# Patient Record
Sex: MALE | Race: WHITE | Employment: OTHER
[De-identification: names, ages, dates, MRNs, and addresses within clinical notes are randomized per-mention and may not be internally consistent; named-entity substitution may affect disease eponyms.]

---

## 2023-03-08 ENCOUNTER — HOSPITAL ENCOUNTER (OUTPATIENT)
Facility: HOSPITAL | Age: 68
Setting detail: RECURRING SERIES
Discharge: HOME | End: 2023-03-11
Payer: MEDICARE

## 2023-03-08 PROCEDURE — 97162 PT EVAL MOD COMPLEX 30 MIN: CPT

## 2023-03-08 NOTE — PROGRESS NOTES
[facility] PHYSICAL THERAPY  [address] [place] [address]. [facility] [address]. Josue Bejarano [facility] Phone: [phone] Fax   Plan of Care / Statement of Necessity for Physical Therapy Services     Patient Name: Chela Tatum : 1955   Medical   Diagnosis: BLE Weakness  BLE LYmph Treatment Diagnosis: No admission diagnoses are documented for this encounter. Onset Date: Chronic since  Payor: Payor: Christiano Gao / Plan: Christiano Gao / Product Type: *No Product type* /    Referral Source: Flippin Peterin [place], [place]* Start of Care [facility]): 3/8/2023   Prior Hospitalization: See medical history Provider #: [id number]   Prior Level of Function: Performs ADL interm, Requires rest break with yard work and prolonged walking    Comorbidities: Hx of Mitral Valve replacement, On Long term antibiotics due to frequent infections, Hx of BLE vein stripping, x6 surgeries to right knee, Right foot surgery, x2 right shoulder surgeries, x3 right hand surgeries, Left elbow cyst removal, Hx of Epilepsy   Medications: Verified on Patient Summary List     Assessment / key information:  Pt is 75 a male who prevents today with c/o R>L LE lymphedema following complex history of veinous insufficiency and x6 surgeries to RLE (TKRs and mutil revisions) due to recurrent infections. He is now on prolonged Doxycycline and presents with right LE lymphedema up to his thigh and left lower limb lymphedema to his knee. He was wearing knee high compression socks issued from [facility] however MD suggested he cease wearing them due to tourniquet effect. He refuses to wear right thigh compression due to difficulty donning. With prior right knee complications, pts right knee ROM limits also present difficulty with donning. Discussed at length today the benefits of how velcro compression garments would allow for thigh high compression and carry over from benefits  Pt and wife were educated on full CDT components. At this time pt would like to decline full services and trial home compression pump due to poor improvements with prior compression socks. Will send info to [facility] for compression pump and velcro garment coverage.   Currently:   Objective Information/Functional Measures/Assessment     Circumference measurements Left cm    Right cm   Big toe 10.5 11   MET 25.5 26.2   Heel/dorsum 38 39   Smallest ankle  28.7 31.3   10 cm prox to ankle 36.6 37.5   Widest Calf 42.9 47.5   Below Knee 48 43   Patella 58 49   10 cm up  62 54.5   20 cms up 69 61.5      Knee Flex Right 95 deg, Knee flex left 102deg.  Lower Limbs 2+ to 3+ pitting, discoloration of full lower limbs with varicose veins presents.       Evaluation Complexity:  History:  HIGH Complexity :3+ comorbidities / personal factors will impact the outcome/ POC ; Examination:  MEDIUM Complexity : 3 Standardized tests and measures addressin body structure, function, activity limitation and / or participation in recreation  ;Presentation:  MEDIUM Complexity : Evolving with changing characteristics  ;Clinical Decision Making:  MEDIUM Complexity : FOTO score of 26-74 FOTO score = an established functional score where 100 = no disability  Overall Complexity Rating: MEDIUM  Problem List: decrease ROM, decrease strength, edema affection function, impaired gait/balance, decrease ADL/functional abilities, decrease activity tolerance, decrease flexibility/joint mobility, and decrease transfer abilities    Treatment Plan may include any combination of the followin Therapeutic Exercise, 53243 Neuromuscular Re-Education, 53670 Manual Therapy, 45327 Therapeutic Activity, 53649 Self Care/Home Management, 40967 Electrical Stim unattended, 45872 Vasopneumatic Device, and 32453 Gait Training  Patient / Family readiness to learn indicated by: asking questions, trying to perform skills, interest, return verbalization , and return demonstration   Persons(s) to be included in  education: patient (P) and family support person (FSP); list wife  Barriers to Learning/Limitations: none  Measures taken if barriers to learning present: NA  Patient Goal (s): \"Id like to get a pump\"  Patient Self Reported Health Status: good  Rehabilitation Potential: fair    Short Term Goals: To be accomplished in 1 visit:   Pt will complete girth measures to facilitate requirements for at home compression pump. Status at IE NA  Long Term Goals: To be accomplished in 2-3 weeks   Pt will be compliant with at home pump use and donning compression garments for self management at home. Status at IE NA  2. Pt will be compliant with home decongestive AROM series to facilitate edema loss at home. Frequency / Duration: Patient would benefit from skilled PT 1-4  times up to 4 sessions as needed in this certification period. Goals will be assigned and reassessed every 10 visits/ 30 days per guidelines . Patient/ Caregiver education and instruction: Diagnosis, prognosis, self care and activity modification [x]  Plan of care has been reviewed with PTA    Certification Period: 3/8/23-6/7/23    Akosua Beltran, PT       3/8/2023       12:27 PM  ===================================================================  I certify that the above Therapy Services are being furnished while the patient is under my care. I agree with the treatment plan and certify that this therapy is necessary. [de-identified] Signature:_________________________   DATE:_________   TIME:________                           Bran Chaidez*    ** Signature, Date and Time must be completed for valid certification **  Please sign and return to [facility] or you may fax the signed copy to [phone]. Thank you.

## 2023-03-08 NOTE — PROGRESS NOTES
PHYSICAL / OCCUPATIONAL THERAPY - DAILY TREATMENT NOTE (updated )    Patient Name: Barbara Reyes    Date: 3/8/2023    : 1955  Insurance: Payor: Alysa Figueroa / Plan: Alysa Figueroa / Product Type: *No Product type* /      Patient  verified yes     Visit #   Current / Total 1 2-4   Time   In / Out 11:00 11:55   Pain   In / Out 0 0   Subjective Functional Status/Changes: Dx Venous Stasis, Varicose veins, Had vein stripping in BLE.  swelling started in Right knee with discoloration in lower limb~. Infection after injection to avoid knee surgery then had a clean out surgery. Infection stayed and head to perform clean out a second time. Third surgery was TKR . 2 years later, infected so placed a spacer for 3 months for Antibiotic knee surgery. Was referred to Dr Mary Kurtz office, removed spacer and did a total revision in Aug 2015. Lasted x6 years and then another injection last year. 2022 another complete revision. Infectious disease has him on Doxycycline probably for ever. Wont wear thigh . Wants lymph pumps  No pain  Limited mobility in right knee. Able to do yard work ut take break  Heart Valve replacement due to heart infection   No kidney issues   Weighs 250-Drinks a lot of water  Surgeries: Crushed Right toe, x2 Right shoulder, x3 Right hand surgeries, Left elbow cyst removal, Epilepsy (med controlled)     Changes to:  Meds, Allergies, Med Hx, Sx Hx? If yes, update Summary List no     TREATMENT AREA =  No admission diagnoses are documented for this encounter.     OBJECTIVE        Therapeutic Procedures:    [x]  Patient Education billed concurrently with other procedures   [x] Review HEP    [] Progressed/Changed HEP, detail:    [] Other detail:     Objective Information/Functional Measures/Assessment    Circumference measurements Left cm   Right cm   Big toe 10.5 11   MET 25.5 26.2   Heel/dorsum 38 39   Smallest ankle  28.7 31.3   10 cm prox to ankle 36.6 37.5   Widest Calf 42.9 47.5   Below Knee 48 43   Patella 58 49   10 cm up  62 54.5   20 cms up 69 61.5     Knee Flex Right 95 deg, Knee flex left 102deg.     Patient will continue to benefit from skilled PT / OT services to modify and progress therapeutic interventions, analyze and address functional mobility deficits, analyze and address ROM deficits, analyze and address strength deficits, analyze and address soft tissue restrictions, analyze and cue for proper movement patterns, analyze and modify for postural abnormalities, analyze and address imbalance/dizziness, and instruct in home and community integration to address functional deficits and attain remaining goals.    Progress toward goals / Updated goals:  []  See Progress Note/Recertification    See POC    PLAN  yes Continue plan of care  []  Upgrade activities as tolerated  []  Discharge due to :  []  Other:    Oswald Kumar, PT    3/8/2023    11:06 AM    No future appointments.

## 2023-05-16 ENCOUNTER — HOME CARE VISIT (OUTPATIENT)
Age: 68
End: 2023-05-16

## 2023-05-16 ENCOUNTER — HOME HEALTH ADMISSION (OUTPATIENT)
Age: 68
End: 2023-05-16

## 2023-05-17 ENCOUNTER — HOME CARE VISIT (OUTPATIENT)
Age: 68
End: 2023-05-17